# Patient Record
Sex: MALE | Race: OTHER | NOT HISPANIC OR LATINO | URBAN - METROPOLITAN AREA
[De-identification: names, ages, dates, MRNs, and addresses within clinical notes are randomized per-mention and may not be internally consistent; named-entity substitution may affect disease eponyms.]

---

## 2018-01-17 ENCOUNTER — EMERGENCY (EMERGENCY)
Facility: HOSPITAL | Age: 48
LOS: 1 days | Discharge: HOME | End: 2018-01-17
Attending: EMERGENCY MEDICINE | Admitting: EMERGENCY MEDICINE
Payer: MEDICARE

## 2018-01-17 VITALS
WEIGHT: 164.91 LBS | SYSTOLIC BLOOD PRESSURE: 127 MMHG | TEMPERATURE: 99 F | HEART RATE: 77 BPM | RESPIRATION RATE: 18 BRPM | OXYGEN SATURATION: 96 % | DIASTOLIC BLOOD PRESSURE: 85 MMHG

## 2018-01-17 DIAGNOSIS — E11.9 TYPE 2 DIABETES MELLITUS WITHOUT COMPLICATIONS: ICD-10-CM

## 2018-01-17 DIAGNOSIS — Z79.899 OTHER LONG TERM (CURRENT) DRUG THERAPY: ICD-10-CM

## 2018-01-17 DIAGNOSIS — N50.82 SCROTAL PAIN: ICD-10-CM

## 2018-01-17 DIAGNOSIS — N50.89 OTHER SPECIFIED DISORDERS OF THE MALE GENITAL ORGANS: ICD-10-CM

## 2018-01-17 DIAGNOSIS — Z79.82 LONG TERM (CURRENT) USE OF ASPIRIN: ICD-10-CM

## 2018-01-17 DIAGNOSIS — E78.5 HYPERLIPIDEMIA, UNSPECIFIED: ICD-10-CM

## 2018-01-17 DIAGNOSIS — I10 ESSENTIAL (PRIMARY) HYPERTENSION: ICD-10-CM

## 2018-01-17 LAB
ALBUMIN SERPL ELPH-MCNC: 3.2 G/DL — LOW (ref 3.4–5)
ALP SERPL-CCNC: 68 U/L — SIGNIFICANT CHANGE UP (ref 40–120)
ALT FLD-CCNC: 11 U/L — LOW (ref 12–42)
ANION GAP SERPL CALC-SCNC: 3 MMOL/L — LOW (ref 9–16)
APPEARANCE UR: CLEAR — SIGNIFICANT CHANGE UP
AST SERPL-CCNC: 34 U/L — SIGNIFICANT CHANGE UP (ref 15–37)
BASOPHILS NFR BLD AUTO: 0.2 % — SIGNIFICANT CHANGE UP (ref 0–2)
BILIRUB SERPL-MCNC: 0.4 MG/DL — SIGNIFICANT CHANGE UP (ref 0.2–1.2)
BILIRUB UR-MCNC: NEGATIVE — SIGNIFICANT CHANGE UP
BUN SERPL-MCNC: 14 MG/DL — SIGNIFICANT CHANGE UP (ref 7–23)
CALCIUM SERPL-MCNC: 9.1 MG/DL — SIGNIFICANT CHANGE UP (ref 8.5–10.5)
CHLORIDE SERPL-SCNC: 96 MMOL/L — SIGNIFICANT CHANGE UP (ref 96–108)
CO2 SERPL-SCNC: 29 MMOL/L — SIGNIFICANT CHANGE UP (ref 22–31)
COLOR SPEC: YELLOW — SIGNIFICANT CHANGE UP
CREAT SERPL-MCNC: 0.92 MG/DL — SIGNIFICANT CHANGE UP (ref 0.5–1.3)
DIFF PNL FLD: NEGATIVE — SIGNIFICANT CHANGE UP
EOSINOPHIL NFR BLD AUTO: 0.6 % — SIGNIFICANT CHANGE UP (ref 0–6)
GLUCOSE SERPL-MCNC: 155 MG/DL — HIGH (ref 70–99)
GLUCOSE UR QL: NEGATIVE — SIGNIFICANT CHANGE UP
HCT VFR BLD CALC: 33.7 % — LOW (ref 39–50)
HGB BLD-MCNC: 11.5 G/DL — LOW (ref 13–17)
IMM GRANULOCYTES NFR BLD AUTO: 0.2 % — SIGNIFICANT CHANGE UP (ref 0–1.5)
KETONES UR-MCNC: (no result) MG/DL
LEUKOCYTE ESTERASE UR-ACNC: NEGATIVE — SIGNIFICANT CHANGE UP
LYMPHOCYTES # BLD AUTO: 39.4 % — SIGNIFICANT CHANGE UP (ref 13–44)
MCHC RBC-ENTMCNC: 30.2 PG — SIGNIFICANT CHANGE UP (ref 27–34)
MCHC RBC-ENTMCNC: 34.1 G/DL — SIGNIFICANT CHANGE UP (ref 32–36)
MCV RBC AUTO: 88.5 FL — SIGNIFICANT CHANGE UP (ref 80–100)
MONOCYTES NFR BLD AUTO: 7.6 % — SIGNIFICANT CHANGE UP (ref 2–14)
NEUTROPHILS NFR BLD AUTO: 52 % — SIGNIFICANT CHANGE UP (ref 43–77)
NITRITE UR-MCNC: NEGATIVE — SIGNIFICANT CHANGE UP
PH UR: 7.5 — SIGNIFICANT CHANGE UP (ref 5–8)
PLATELET # BLD AUTO: 273 K/UL — SIGNIFICANT CHANGE UP (ref 150–400)
POTASSIUM SERPL-MCNC: 5.4 MMOL/L — HIGH (ref 3.5–5.3)
POTASSIUM SERPL-SCNC: 5.4 MMOL/L — HIGH (ref 3.5–5.3)
PROT SERPL-MCNC: 7.3 G/DL — SIGNIFICANT CHANGE UP (ref 6.4–8.2)
PROT UR-MCNC: NEGATIVE MG/DL — SIGNIFICANT CHANGE UP
RBC # BLD: 3.81 M/UL — LOW (ref 4.2–5.8)
RBC # FLD: 12.4 % — SIGNIFICANT CHANGE UP (ref 10.3–16.9)
SODIUM SERPL-SCNC: 128 MMOL/L — LOW (ref 132–145)
SP GR SPEC: 1.02 — SIGNIFICANT CHANGE UP (ref 1–1.03)
UROBILINOGEN FLD QL: 1 E.U./DL — SIGNIFICANT CHANGE UP
WBC # BLD: 6.2 K/UL — SIGNIFICANT CHANGE UP (ref 3.8–10.5)
WBC # FLD AUTO: 6.2 K/UL — SIGNIFICANT CHANGE UP (ref 3.8–10.5)

## 2018-01-17 PROCEDURE — 72193 CT PELVIS W/DYE: CPT | Mod: 26

## 2018-01-17 PROCEDURE — 99284 EMERGENCY DEPT VISIT MOD MDM: CPT

## 2018-01-17 RX ORDER — SODIUM CHLORIDE 9 MG/ML
1000 INJECTION INTRAMUSCULAR; INTRAVENOUS; SUBCUTANEOUS ONCE
Qty: 0 | Refills: 0 | Status: COMPLETED | OUTPATIENT
Start: 2018-01-17 | End: 2018-01-17

## 2018-01-17 RX ORDER — HALOPERIDOL DECANOATE 100 MG/ML
5 INJECTION INTRAMUSCULAR ONCE
Qty: 0 | Refills: 0 | Status: COMPLETED | OUTPATIENT
Start: 2018-01-17 | End: 2018-01-17

## 2018-01-17 RX ORDER — DIVALPROEX SODIUM 500 MG/1
500 TABLET, DELAYED RELEASE ORAL ONCE
Qty: 0 | Refills: 0 | Status: COMPLETED | OUTPATIENT
Start: 2018-01-17 | End: 2018-01-17

## 2018-01-17 RX ORDER — BENZTROPINE MESYLATE 1 MG
2 TABLET ORAL ONCE
Qty: 0 | Refills: 0 | Status: COMPLETED | OUTPATIENT
Start: 2018-01-17 | End: 2018-01-17

## 2018-01-17 RX ADMIN — DIVALPROEX SODIUM 500 MILLIGRAM(S): 500 TABLET, DELAYED RELEASE ORAL at 21:35

## 2018-01-17 RX ADMIN — SODIUM CHLORIDE 1000 MILLILITER(S): 9 INJECTION INTRAMUSCULAR; INTRAVENOUS; SUBCUTANEOUS at 19:14

## 2018-01-17 RX ADMIN — Medication 2 MILLIGRAM(S): at 21:35

## 2018-01-17 RX ADMIN — HALOPERIDOL DECANOATE 5 MILLIGRAM(S): 100 INJECTION INTRAMUSCULAR at 21:35

## 2018-01-17 NOTE — ED PROVIDER NOTE - PROGRESS NOTE DETAILS
check labs CT, will rx doxy PO and antifungal cream, f/u with dermatology Ct without abscess or gas, will discharge with instructions to keep area dry and elevated, antibiotics, topical antifungal, instructions to follow up with dermatology

## 2018-01-17 NOTE — ED ADULT TRIAGE NOTE - CHIEF COMPLAINT QUOTE
here for bilateral testicular pain x 1 day- pt with MR, glaucoma, schizophrenia- arrives with aide from assisted living facility

## 2018-01-17 NOTE — ED ADULT NURSE NOTE - PMH
DM (diabetes mellitus)    Glaucoma    HLD (hyperlipidemia)    HTN (hypertension)    MR (mental retardation), moderate    Schizophrenia

## 2018-01-17 NOTE — ED PROVIDER NOTE - OBJECTIVE STATEMENT
47 y.o. male with DM (diabetes mellitus)  Glaucoma  HLD (hyperlipidemia)  HTN (hypertension)  MR (mental retardation), moderate  Schizophrenia    with c/o scrotal lesions, pain and swelling, pt has hx of MR and lives in a group home, limited hx.  Caregiver at bedside states no fever/chills, normal appetite.

## 2018-01-17 NOTE — ED PROVIDER NOTE - GENITOURINARY SCROTUM
LESION/(+) excoriations and ulceration, mild swelling of scrotum, pruny appearance to skin fro excess moisture and what appears to be poor hygiene , area is moist and wet. LESION/(+) excoriations and ulceration, mild swelling of scrotum, pruny appearance to skin fro excess moisture and what appears to be poor hygiene , area is moist and wet, no testicular tenderness.

## 2018-01-17 NOTE — ED PROVIDER NOTE - MEDICAL DECISION MAKING DETAILS
scrotal swelling, topical ulceration, macerated skin, will get CT scan to check for soft tissue infection, signed out to dr bullard pending CT results. Plan to treat with PO abx and topical antifungal if no signs of severe infection on CT

## 2018-01-18 VITALS
OXYGEN SATURATION: 95 % | RESPIRATION RATE: 16 BRPM | HEART RATE: 72 BPM | SYSTOLIC BLOOD PRESSURE: 131 MMHG | DIASTOLIC BLOOD PRESSURE: 82 MMHG

## 2018-01-18 RX ORDER — CIPROFLOXACIN LACTATE 400MG/40ML
1 VIAL (ML) INTRAVENOUS
Qty: 14 | Refills: 0 | OUTPATIENT
Start: 2018-01-18 | End: 2018-01-24

## 2018-01-18 RX ORDER — METRONIDAZOLE 500 MG
1 TABLET ORAL
Qty: 14 | Refills: 0 | OUTPATIENT
Start: 2018-01-18 | End: 2018-01-24

## 2018-01-18 RX ADMIN — Medication 450 MILLIGRAM(S): at 01:38

## 2018-01-18 NOTE — ED POST DISCHARGE NOTE - OTHER COMMUNICATION
spoke with JUAN CARLOS Merchant at the group home and discussed findings on imaging and the need for close follow up.  Gave information for Dr. Hazel

## 2018-01-18 NOTE — ED ADULT NURSE REASSESSMENT NOTE - NS ED NURSE REASSESS COMMENT FT1
pt discharged, ambulatory, accompanied by health aide. pt discharged kory to community care/shelter, ambulatory, accompanied by health aide.

## 2018-02-23 ENCOUNTER — OUTPATIENT (OUTPATIENT)
Dept: OUTPATIENT SERVICES | Facility: HOSPITAL | Age: 48
LOS: 1 days | Discharge: HOME | End: 2018-02-23
Payer: MEDICARE

## 2018-02-23 LAB — GLUCOSE BLDC GLUCOMTR-MCNC: 106 MG/DL — HIGH (ref 70–99)

## 2018-02-23 PROCEDURE — 82962 GLUCOSE BLOOD TEST: CPT

## 2018-02-23 PROCEDURE — 88305 TISSUE EXAM BY PATHOLOGIST: CPT

## 2018-02-23 PROCEDURE — 45380 COLONOSCOPY AND BIOPSY: CPT

## 2018-02-26 LAB — SURGICAL PATHOLOGY STUDY: SIGNIFICANT CHANGE UP

## 2018-06-15 ENCOUNTER — EMERGENCY (EMERGENCY)
Facility: HOSPITAL | Age: 48
LOS: 1 days | Discharge: HOME | End: 2018-06-15
Attending: EMERGENCY MEDICINE | Admitting: EMERGENCY MEDICINE
Payer: MEDICARE

## 2018-06-15 VITALS
SYSTOLIC BLOOD PRESSURE: 144 MMHG | RESPIRATION RATE: 16 BRPM | WEIGHT: 162.04 LBS | HEIGHT: 65 IN | TEMPERATURE: 98 F | OXYGEN SATURATION: 95 % | HEART RATE: 86 BPM | DIASTOLIC BLOOD PRESSURE: 72 MMHG

## 2018-06-15 DIAGNOSIS — Z79.899 OTHER LONG TERM (CURRENT) DRUG THERAPY: ICD-10-CM

## 2018-06-15 DIAGNOSIS — I95.9 HYPOTENSION, UNSPECIFIED: ICD-10-CM

## 2018-06-15 DIAGNOSIS — E11.9 TYPE 2 DIABETES MELLITUS WITHOUT COMPLICATIONS: ICD-10-CM

## 2018-06-15 DIAGNOSIS — I10 ESSENTIAL (PRIMARY) HYPERTENSION: ICD-10-CM

## 2018-06-15 DIAGNOSIS — E78.5 HYPERLIPIDEMIA, UNSPECIFIED: ICD-10-CM

## 2018-06-15 DIAGNOSIS — E87.1 HYPO-OSMOLALITY AND HYPONATREMIA: ICD-10-CM

## 2018-06-15 DIAGNOSIS — Z79.82 LONG TERM (CURRENT) USE OF ASPIRIN: ICD-10-CM

## 2018-06-15 LAB
ALBUMIN SERPL ELPH-MCNC: 3.3 G/DL — LOW (ref 3.4–5)
ALP SERPL-CCNC: 75 U/L — SIGNIFICANT CHANGE UP (ref 40–120)
ALT FLD-CCNC: 27 U/L — SIGNIFICANT CHANGE UP (ref 12–42)
ANION GAP SERPL CALC-SCNC: 6 MMOL/L — LOW (ref 9–16)
AST SERPL-CCNC: 32 U/L — SIGNIFICANT CHANGE UP (ref 15–37)
BASOPHILS NFR BLD AUTO: 0.3 % — SIGNIFICANT CHANGE UP (ref 0–2)
BILIRUB SERPL-MCNC: 0.3 MG/DL — SIGNIFICANT CHANGE UP (ref 0.2–1.2)
BUN SERPL-MCNC: 22 MG/DL — SIGNIFICANT CHANGE UP (ref 7–23)
CALCIUM SERPL-MCNC: 9 MG/DL — SIGNIFICANT CHANGE UP (ref 8.5–10.5)
CHLORIDE SERPL-SCNC: 88 MMOL/L — LOW (ref 96–108)
CO2 SERPL-SCNC: 30 MMOL/L — SIGNIFICANT CHANGE UP (ref 22–31)
CREAT SERPL-MCNC: 1.22 MG/DL — SIGNIFICANT CHANGE UP (ref 0.5–1.3)
EOSINOPHIL NFR BLD AUTO: 0.9 % — SIGNIFICANT CHANGE UP (ref 0–6)
GLUCOSE SERPL-MCNC: 79 MG/DL — SIGNIFICANT CHANGE UP (ref 70–99)
HCT VFR BLD CALC: 30.6 % — LOW (ref 39–50)
HGB BLD-MCNC: 10.8 G/DL — LOW (ref 13–17)
IMM GRANULOCYTES NFR BLD AUTO: 0.3 % — SIGNIFICANT CHANGE UP (ref 0–1.5)
LYMPHOCYTES # BLD AUTO: 55.7 % — HIGH (ref 13–44)
MAGNESIUM SERPL-MCNC: 1.5 MG/DL — LOW (ref 1.6–2.6)
MCHC RBC-ENTMCNC: 30.2 PG — SIGNIFICANT CHANGE UP (ref 27–34)
MCHC RBC-ENTMCNC: 35.3 G/DL — SIGNIFICANT CHANGE UP (ref 32–36)
MCV RBC AUTO: 85.5 FL — SIGNIFICANT CHANGE UP (ref 80–100)
MONOCYTES NFR BLD AUTO: 13.7 % — SIGNIFICANT CHANGE UP (ref 2–14)
NEUTROPHILS NFR BLD AUTO: 29.1 % — LOW (ref 43–77)
PLATELET # BLD AUTO: 143 K/UL — LOW (ref 150–400)
POTASSIUM SERPL-MCNC: 4.6 MMOL/L — SIGNIFICANT CHANGE UP (ref 3.5–5.3)
POTASSIUM SERPL-SCNC: 4.6 MMOL/L — SIGNIFICANT CHANGE UP (ref 3.5–5.3)
PROT SERPL-MCNC: 6.6 G/DL — SIGNIFICANT CHANGE UP (ref 6.4–8.2)
RBC # BLD: 3.58 M/UL — LOW (ref 4.2–5.8)
RBC # FLD: 11.7 % — SIGNIFICANT CHANGE UP (ref 10.3–16.9)
SODIUM SERPL-SCNC: 124 MMOL/L — LOW (ref 132–145)
TROPONIN I SERPL-MCNC: <0.017 NG/ML — LOW (ref 0.02–0.06)
WBC # BLD: 3.4 K/UL — LOW (ref 3.8–10.5)
WBC # FLD AUTO: 3.4 K/UL — LOW (ref 3.8–10.5)

## 2018-06-15 PROCEDURE — 71045 X-RAY EXAM CHEST 1 VIEW: CPT | Mod: 26

## 2018-06-15 PROCEDURE — 93010 ELECTROCARDIOGRAM REPORT: CPT

## 2018-06-15 PROCEDURE — 99285 EMERGENCY DEPT VISIT HI MDM: CPT | Mod: 25

## 2018-06-15 RX ORDER — SODIUM CHLORIDE 9 MG/ML
1000 INJECTION INTRAMUSCULAR; INTRAVENOUS; SUBCUTANEOUS ONCE
Qty: 0 | Refills: 0 | Status: COMPLETED | OUTPATIENT
Start: 2018-06-15 | End: 2018-06-15

## 2018-06-15 RX ADMIN — SODIUM CHLORIDE 2000 MILLILITER(S): 9 INJECTION INTRAMUSCULAR; INTRAVENOUS; SUBCUTANEOUS at 23:00

## 2018-06-15 NOTE — ED ADULT NURSE NOTE - CHIEF COMPLAINT QUOTE
pt BIBA from [facility] facility for hypotension-  had vital signs taken this evening, and he was found to be hypotensive to 60's systolic. Pt had recent procedure on his esophagus and has hx of pica. Had first solid meal this evening.  Pt was given 500 mL fluid bolus en route, BP now 114/72. Pt has no complaints.

## 2018-06-15 NOTE — ED PROVIDER NOTE - MEDICAL DECISION MAKING DETAILS
pt BIBA for hypotension to 60s/30s today with generalized weakness, well appearing and AxOx3, afebrile and no change in meds noted. s/p 500cc of IVF with improvement in BP, non toxic appearing, labs with anemia and hyponatremia to 124, will keep in obs for serial labs and close monitoring of mental status, serial neuro checks

## 2018-06-15 NOTE — ED ADULT NURSE NOTE - OBJECTIVE STATEMENT
Pt sent from nursing facility for eval of hypotension. PMH PICA S/P esophagus repair, pt had first solid meal today. found to be hypotensive by facility RN SBP 61. VSS in ED. Pt lethargic, arousable to voice/shake

## 2018-06-15 NOTE — ED PROVIDER NOTE - PHYSICAL EXAMINATION
Vital Signs - nursing notes reviewed and confirmed  Gen - WDWN M, NAD, comfortable and non-toxic appearing, speaking in full sentences   Skin - warm, dry, intact  HEENT - AT/NC, PERRL, EOMI, no conjunctival injection, moist oral mucosa, o/p clear with no erythema, edema, or exudate, uvula midline, airway patent, neck supple and NT, FROM  CV - S1S2, R/R/R  Resp - respiration non-labored, CTAB, symmetric bs b/l, no r/r/w  GI - NABS, soft, ND, NT, no rebound or guarding, no CVAT b/l   MS - w/w/p, no c/c/e, calves supple and NT, distal pulses symmetric b/l   Neuro - AxOx3, no focal neuro deficits, CN II-XII grossly intact, ambulatory without gait disturbance

## 2018-06-15 NOTE — ED PROVIDER NOTE - OBJECTIVE STATEMENT
49 yo M with PMHx high functioning MR, paranoid schizophrenia, DM, glaucoma, HTN, HLD, pica s/p recent esophageal repair, was on 4 wks of only liquid diets, had his first soft solid diet today and tolerated well, sent from [facility] assisted living facility for evaluation of hypotension.  Pt had his first solid meals today and went out for a walk subsequently, reports feeling fine except slight generalized weakness, went to get his evening meds and was found to have BP of 61/38 by facility RN. Repeat BP by EMS 65/40, s/p 500cc of IVF en route to the ED with improvement of BP to 114/70s.  No other complaints or change in meds noted except pt was taken off colace and pepcid after esophageal repair.  Denies LOC, HA, dizziness, change in mental status, lethargy, fever, chills, CP, SOB, palpitations, N/V/D/C, bleeding, focal weakness, malaise, and rash

## 2018-06-15 NOTE — ED PROVIDER NOTE - ATTENDING CONTRIBUTION TO CARE
49 yo M with PMHx high functioning MR, paranoid schizophrenia, DM, glaucoma, HTN, HLD, pica s/p recent esophageal repair, was on 4 wks of only liquid diets, had his first soft solid diet today and tolerated well, sent from [facility] assisted living facility for evaluation of hypotension.  Pt had his first solid meals today and went out for a walk subsequently, reports feeling fine except slight generalized weakness, went to get his evening meds and was found to have BP of 61/38 by facility RN. Repeat BP by EMS 65/40, s/p 500cc of IVF en route to the ED with improvement of BP to 114/70s.  No other complaints or change in meds noted except pt was taken off colace and pepcid after esophageal repair.  Denies LOC, HA, dizziness, change in mental status, lethargy, fever, chills, CP, SOB, palpitations, N/V/D/C, bleeding, focal weakness, malaise, and rash

## 2018-06-15 NOTE — ED ADULT TRIAGE NOTE - CHIEF COMPLAINT QUOTE
pt BIBA from [facility] facility for hypotension-  had vital signs taken this evening, and he was found to be hypotensive to 60's systolic. Pt had recent procedure on his esophagus and has hx of pica. Had first solid meal this evening.  Pt was given 500 mL fluid bolus en route, BP now 114/72. Pt has no complaints.

## 2018-06-16 VITALS
DIASTOLIC BLOOD PRESSURE: 89 MMHG | SYSTOLIC BLOOD PRESSURE: 131 MMHG | RESPIRATION RATE: 16 BRPM | HEART RATE: 77 BPM | OXYGEN SATURATION: 97 %

## 2018-06-16 LAB
ANION GAP SERPL CALC-SCNC: 3 MMOL/L — LOW (ref 9–16)
ANION GAP SERPL CALC-SCNC: 6 MMOL/L — LOW (ref 9–16)
BUN SERPL-MCNC: 15 MG/DL — SIGNIFICANT CHANGE UP (ref 7–23)
BUN SERPL-MCNC: 18 MG/DL — SIGNIFICANT CHANGE UP (ref 7–23)
CALCIUM SERPL-MCNC: 9 MG/DL — SIGNIFICANT CHANGE UP (ref 8.5–10.5)
CALCIUM SERPL-MCNC: 9 MG/DL — SIGNIFICANT CHANGE UP (ref 8.5–10.5)
CHLORIDE SERPL-SCNC: 92 MMOL/L — LOW (ref 96–108)
CHLORIDE SERPL-SCNC: 93 MMOL/L — LOW (ref 96–108)
CO2 SERPL-SCNC: 29 MMOL/L — SIGNIFICANT CHANGE UP (ref 22–31)
CO2 SERPL-SCNC: 32 MMOL/L — HIGH (ref 22–31)
CREAT SERPL-MCNC: 0.97 MG/DL — SIGNIFICANT CHANGE UP (ref 0.5–1.3)
CREAT SERPL-MCNC: 1.02 MG/DL — SIGNIFICANT CHANGE UP (ref 0.5–1.3)
GLUCOSE SERPL-MCNC: 104 MG/DL — HIGH (ref 70–99)
GLUCOSE SERPL-MCNC: 91 MG/DL — SIGNIFICANT CHANGE UP (ref 70–99)
MAGNESIUM SERPL-MCNC: 1.8 MG/DL — SIGNIFICANT CHANGE UP (ref 1.6–2.6)
OB PNL STL: NEGATIVE — SIGNIFICANT CHANGE UP
POTASSIUM SERPL-MCNC: 4.9 MMOL/L — SIGNIFICANT CHANGE UP (ref 3.5–5.3)
POTASSIUM SERPL-MCNC: 5.2 MMOL/L — SIGNIFICANT CHANGE UP (ref 3.5–5.3)
POTASSIUM SERPL-SCNC: 4.9 MMOL/L — SIGNIFICANT CHANGE UP (ref 3.5–5.3)
POTASSIUM SERPL-SCNC: 5.2 MMOL/L — SIGNIFICANT CHANGE UP (ref 3.5–5.3)
SODIUM SERPL-SCNC: 127 MMOL/L — LOW (ref 132–145)
SODIUM SERPL-SCNC: 128 MMOL/L — LOW (ref 132–145)

## 2018-06-16 PROCEDURE — 99218: CPT | Mod: 25

## 2018-06-16 RX ORDER — MAGNESIUM SULFATE 500 MG/ML
1 VIAL (ML) INJECTION ONCE
Qty: 0 | Refills: 0 | Status: COMPLETED | OUTPATIENT
Start: 2018-06-16 | End: 2018-06-16

## 2018-06-16 RX ADMIN — Medication 100 GRAM(S): at 00:33

## 2018-06-16 NOTE — ED CDU PROVIDER INITIAL DAY NOTE - OBJECTIVE STATEMENT
49 yo M with PMHx high functioning MR, paranoid schizophrenia, DM, glaucoma, HTN, HLD, pica s/p recent esophageal repair, was on 4 wks of only liquid diets, had his first soft solid diet today and tolerated well, sent from [facility] assisted living facility for evaluation of hypotension.  Pt had his first solid meals today and went out for a walk subsequently, reports feeling fine except slight generalized weakness, went to get his evening meds and was found to have BP of 61/38 by facility RN. Repeat BP by EMS 65/40, s/p 500cc of IVF en route to the ED with improvement of BP to 114/70s.  No other complaints or change in meds noted except pt was taken off colace and pepcid after esophageal repair.  Denies LOC, HA, dizziness, change in mental status, lethargy, fever, chills, CP, SOB, palpitations, N/V/D/C, bleeding, focal weakness, malaise, and rash

## 2018-06-16 NOTE — ED CDU PROVIDER DISPOSITION NOTE - CLINICAL COURSE
49 yo M with PMHx high functioning MR, paranoid schizophrenia, DM, glaucoma, HTN, HLD, pica s/p recent esophageal repair, was on 4 wks of only liquid diets, had his first soft solid diet today and tolerated well, sent from [facility] assisted living facility for evaluation of hypotension.  Pt had his first solid meals today and went out for a walk subsequently, reports feeling fine except slight generalized weakness, went to get his evening meds and was found to have BP of 61/38 by facility RN. Repeat BP by EMS 65/40, s/p 500cc of IVF en route to the ED with improvement of BP to 114/70s. Labs significant for Na of 124,  AxOx3, reports no active sx except for feeling tired and sleepy.  repeat VS stable, never had any vital sign derangements during Obs stay in the ED, pt noted to be multiple AED/antipsychotics with long period of liquid diet, likely drug induced and hypervolemic hyperosmolar hyponatremia, serial Na with slow uptrending of Na to 128, rec fluid restrict to 1L, repeat labs in 1-2, f/u with psych to optimize meds outpt, pt and counseling verbalized understanding

## 2018-06-16 NOTE — ED ADULT NURSE REASSESSMENT NOTE - NS ED NURSE REASSESS COMMENT FT1
Pt had 1 episode of loose  brown water stool.  States he feels better. Denies N/V/HA/abdominal pain/ dizziness.   No attempts to consume non food items RA at bed side.  Gait steady No complaint of pain nor discomfort
Pt resting comfortably on stretcher . Walked to the bathroom with RA. Denies N/V/HA/abdominal pain/ dizziness.   No attempts to consume non food items RA at bed side.  Gait steady No complaint of pain nor discomfort. Awaiting 7a am lab draw

## 2018-06-16 NOTE — ED ADULT NURSE REASSESSMENT NOTE - GENERAL PATIENT STATE
at bed side/comfortable appearance/cooperative/family/SO at bedside
 at bed side/comfortable appearance/cooperative/family/SO at bedside

## 2018-06-16 NOTE — ED CDU PROVIDER DISPOSITION NOTE - ATTENDING CONTRIBUTION TO CARE
47 yo M with PMHx high functioning MR, paranoid schizophrenia, DM, glaucoma, HTN, HLD, pica s/p recent esophageal repair, was on 4 wks of only liquid diets, had his first soft solid diet today and tolerated well, sent from [facility] assisted living facility for evaluation of hypotension.  Pt had his first solid meals today and went out for a walk subsequently, reports feeling fine except slight generalized weakness, went to get his evening meds and was found to have BP of 61/38 by facility RN. Repeat BP by EMS 65/40, s/p 500cc of IVF en route to the ED with improvement of BP to 114/70s.  No other complaints or change in meds noted except pt was taken off colace and pepcid after esophageal repair.  Denies LOC, HA, dizziness, change in mental status, lethargy, fever, chills, CP, SOB, palpitations, N/V/D/C, bleeding, focal weakness, malaise, and rash

## 2018-06-16 NOTE — ED CDU PROVIDER INITIAL DAY NOTE - PROGRESS NOTE DETAILS
AxOx3, reports no active sx except for feeling tired and sleepy.  repeat VS stable, Na 124, pt noted to be multiple AED/antipsychotics, likely drug induced hyponatremia, will continue close monitor, serial labs and neuro checks mental status unchanged, sleeping comfortably in bed, no acute complaints, will obtain repeat BMP and continue current management repeat Na gradually uptrending with reasonable correction rate, neurologically intact, BP stable with no hemodynamic instability noted

## 2022-01-12 NOTE — ED PROVIDER NOTE - PROGRESS NOTE
Pharmacist chart review completed for refill of Copaxone indicates no medication or significant health changes since last pharmacist counseling. No questions for the pharmacist. Communicated with patient over phone. Patient's prescription was billed through commercial insurance plan. Medication shipped on 1/13 via [employer] to [id number] for delivery in 1-2 business days.     Colin Gao [facility]  Phone: [phone]  [email or web address]          Improved.

## 2022-01-20 NOTE — ED CDU PROVIDER INITIAL DAY NOTE - ATTENDING CONTRIBUTION TO CARE
Call Center TCM Note      Responses   [facility] patient discharged from? [place]   Does the patient have one of the following disease processes/diagnoses(primary or secondary)? COPD/Pneumonia   Was the primary reason for admission: Pneumonia   TCM attempt successful? No   Unsuccessful attempts Attempt 2          Debbie Simmons RN    1/20/2022, 16:04 EST      
47 yo M with PMHx high functioning MR, paranoid schizophrenia, DM, glaucoma, HTN, HLD, pica s/p recent esophageal repair, was on 4 wks of only liquid diets, had his first soft solid diet today and tolerated well, sent from [facility] assisted living facility for evaluation of hypotension.  Pt had his first solid meals today and went out for a walk subsequently, reports feeling fine except slight generalized weakness, went to get his evening meds and was found to have BP of 61/38 by facility RN. Repeat BP by EMS 65/40, s/p 500cc of IVF en route to the ED with improvement of BP to 114/70s.  No other complaints or change in meds noted except pt was taken off colace and pepcid after esophageal repair.  Denies LOC, HA, dizziness, change in mental status, lethargy, fever, chills, CP, SOB, palpitations, N/V/D/C, bleeding, focal weakness, malaise, and rash
